# Patient Record
Sex: FEMALE | Race: WHITE | NOT HISPANIC OR LATINO | Employment: OTHER | URBAN - METROPOLITAN AREA
[De-identification: names, ages, dates, MRNs, and addresses within clinical notes are randomized per-mention and may not be internally consistent; named-entity substitution may affect disease eponyms.]

---

## 2018-06-12 ENCOUNTER — HOSPITAL ENCOUNTER (OUTPATIENT)
Dept: LAB | Facility: MEDICAL CENTER | Age: 53
End: 2018-06-12
Attending: FAMILY MEDICINE
Payer: COMMERCIAL

## 2018-06-12 LAB
ALBUMIN SERPL BCP-MCNC: 4.6 G/DL (ref 3.2–4.9)
ALBUMIN/GLOB SERPL: 1.6 G/DL
ALP SERPL-CCNC: 68 U/L (ref 30–99)
ALT SERPL-CCNC: 18 U/L (ref 2–50)
ANION GAP SERPL CALC-SCNC: 9 MMOL/L (ref 0–11.9)
AST SERPL-CCNC: 19 U/L (ref 12–45)
BASOPHILS # BLD AUTO: 0.5 % (ref 0–1.8)
BASOPHILS # BLD: 0.02 K/UL (ref 0–0.12)
BILIRUB SERPL-MCNC: 0.8 MG/DL (ref 0.1–1.5)
BUN SERPL-MCNC: 11 MG/DL (ref 8–22)
CALCIUM SERPL-MCNC: 9.4 MG/DL (ref 8.5–10.5)
CHLORIDE SERPL-SCNC: 105 MMOL/L (ref 96–112)
CHOLEST SERPL-MCNC: 200 MG/DL (ref 100–199)
CO2 SERPL-SCNC: 26 MMOL/L (ref 20–33)
CREAT SERPL-MCNC: 1.01 MG/DL (ref 0.5–1.4)
CRP SERPL HS-MCNC: 0.9 MG/L (ref 0–7.5)
DHEA-S SERPL-MCNC: 339.5 UG/DL (ref 35.4–256)
EOSINOPHIL # BLD AUTO: 0.04 K/UL (ref 0–0.51)
EOSINOPHIL NFR BLD: 1 % (ref 0–6.9)
ERYTHROCYTE [DISTWIDTH] IN BLOOD BY AUTOMATED COUNT: 45.3 FL (ref 35.9–50)
ESTRADIOL SERPL-MCNC: 44 PG/ML
FERRITIN SERPL-MCNC: 66.7 NG/ML (ref 10–291)
FOLATE SERPL-MCNC: 17.3 NG/ML
FSH SERPL-ACNC: 87.6 MIU/ML
GLOBULIN SER CALC-MCNC: 2.8 G/DL (ref 1.9–3.5)
GLUCOSE SERPL-MCNC: 97 MG/DL (ref 65–99)
HCT VFR BLD AUTO: 48.1 % (ref 37–47)
HCYS SERPL-SCNC: 12.46 UMOL/L
HDLC SERPL-MCNC: 69 MG/DL
HGB BLD-MCNC: 15.9 G/DL (ref 12–16)
IMM GRANULOCYTES # BLD AUTO: 0 K/UL (ref 0–0.11)
IMM GRANULOCYTES NFR BLD AUTO: 0 % (ref 0–0.9)
LDLC SERPL CALC-MCNC: 111 MG/DL
LH SERPL-ACNC: 43 IU/L
LYMPHOCYTES # BLD AUTO: 1.17 K/UL (ref 1–4.8)
LYMPHOCYTES NFR BLD: 27.8 % (ref 22–41)
MCH RBC QN AUTO: 31.1 PG (ref 27–33)
MCHC RBC AUTO-ENTMCNC: 33.1 G/DL (ref 33.6–35)
MCV RBC AUTO: 93.9 FL (ref 81.4–97.8)
MONOCYTES # BLD AUTO: 0.27 K/UL (ref 0–0.85)
MONOCYTES NFR BLD AUTO: 6.4 % (ref 0–13.4)
NEUTROPHILS # BLD AUTO: 2.71 K/UL (ref 2–7.15)
NEUTROPHILS NFR BLD: 64.3 % (ref 44–72)
NRBC # BLD AUTO: 0 K/UL
NRBC BLD-RTO: 0 /100 WBC
PLATELET # BLD AUTO: 112 K/UL (ref 164–446)
PMV BLD AUTO: 13.1 FL (ref 9–12.9)
POTASSIUM SERPL-SCNC: 4 MMOL/L (ref 3.6–5.5)
PROGEST SERPL-MCNC: 0.95 NG/ML
PROT SERPL-MCNC: 7.4 G/DL (ref 6–8.2)
RBC # BLD AUTO: 5.12 M/UL (ref 4.2–5.4)
SODIUM SERPL-SCNC: 140 MMOL/L (ref 135–145)
T4 FREE SERPL-MCNC: 0.76 NG/DL (ref 0.53–1.43)
TRIGL SERPL-MCNC: 102 MG/DL (ref 0–149)
TSH SERPL DL<=0.005 MIU/L-ACNC: 4.74 UIU/ML (ref 0.38–5.33)
VIT B12 SERPL-MCNC: 509 PG/ML (ref 211–911)
WBC # BLD AUTO: 4.2 K/UL (ref 4.8–10.8)

## 2018-06-12 PROCEDURE — 82728 ASSAY OF FERRITIN: CPT

## 2018-06-12 PROCEDURE — 84439 ASSAY OF FREE THYROXINE: CPT

## 2018-06-12 PROCEDURE — 86141 C-REACTIVE PROTEIN HS: CPT

## 2018-06-12 PROCEDURE — 82746 ASSAY OF FOLIC ACID SERUM: CPT

## 2018-06-12 PROCEDURE — 80061 LIPID PANEL: CPT

## 2018-06-12 PROCEDURE — 83001 ASSAY OF GONADOTROPIN (FSH): CPT

## 2018-06-12 PROCEDURE — 83704 LIPOPROTEIN BLD QUAN PART: CPT

## 2018-06-12 PROCEDURE — 82607 VITAMIN B-12: CPT

## 2018-06-12 PROCEDURE — 84443 ASSAY THYROID STIM HORMONE: CPT

## 2018-06-12 PROCEDURE — 86665 EPSTEIN-BARR CAPSID VCA: CPT

## 2018-06-12 PROCEDURE — 86664 EPSTEIN-BARR NUCLEAR ANTIGEN: CPT

## 2018-06-12 PROCEDURE — 83695 ASSAY OF LIPOPROTEIN(A): CPT

## 2018-06-12 PROCEDURE — 84144 ASSAY OF PROGESTERONE: CPT

## 2018-06-12 PROCEDURE — 83090 ASSAY OF HOMOCYSTEINE: CPT

## 2018-06-12 PROCEDURE — 82627 DEHYDROEPIANDROSTERONE: CPT

## 2018-06-12 PROCEDURE — 85025 COMPLETE CBC W/AUTO DIFF WBC: CPT

## 2018-06-12 PROCEDURE — 36415 COLL VENOUS BLD VENIPUNCTURE: CPT

## 2018-06-12 PROCEDURE — 86663 EPSTEIN-BARR ANTIBODY: CPT

## 2018-06-12 PROCEDURE — 83002 ASSAY OF GONADOTROPIN (LH): CPT

## 2018-06-12 PROCEDURE — 80053 COMPREHEN METABOLIC PANEL: CPT

## 2018-06-12 PROCEDURE — 82670 ASSAY OF TOTAL ESTRADIOL: CPT

## 2018-06-13 LAB
EBV EA-D IGG SER-ACNC: 17.1 U/ML (ref 0–10.9)
EBV NA IGG SER IA-ACNC: 281 U/ML (ref 0–21.9)
EBV VCA IGG SER IA-ACNC: 101 U/ML (ref 0–21.9)
EBV VCA IGM SER IA-ACNC: <10 U/ML (ref 0–43.9)
LPA SERPL-MCNC: 75 MG/DL

## 2018-06-15 LAB
CHOLEST SERPL-MCNC: 209 MG/DL
HDL PARTICAL NO Q4363: 40.3 UMOL/L
HDL SIZE Q4361: 9.1 NM
HDLC SERPL-MCNC: 69 MG/DL (ref 40–59)
HLD.LARGE SERPL-SCNC: 7.4 UMOL/L
L VLDL PART NO Q4357: 3.4 NMOL/L
LDL SERPL QN: 21.2 NM
LDL SERPL-SCNC: 1201 NMOL/L
LDL SMALL SERPL-SCNC: 388 NMOL/L
LDLC SERPL CALC-MCNC: 120 MG/DL
PATHOLOGY STUDY: ABNORMAL
TRIGL SERPL-MCNC: 101 MG/DL (ref 30–149)
VLDL SIZE Q4362: 49 NM

## 2018-06-20 ENCOUNTER — HOSPITAL ENCOUNTER (OUTPATIENT)
Dept: RADIOLOGY | Facility: MEDICAL CENTER | Age: 53
End: 2018-06-20
Attending: FAMILY MEDICINE
Payer: COMMERCIAL

## 2018-06-20 DIAGNOSIS — Z12.31 VISIT FOR SCREENING MAMMOGRAM: ICD-10-CM

## 2018-06-20 DIAGNOSIS — R42 DIZZINESS AND GIDDINESS: ICD-10-CM

## 2018-06-20 PROCEDURE — 93880 EXTRACRANIAL BILAT STUDY: CPT

## 2018-06-20 PROCEDURE — 77067 SCR MAMMO BI INCL CAD: CPT

## 2018-07-30 ENCOUNTER — HOSPITAL ENCOUNTER (OUTPATIENT)
Dept: LAB | Facility: MEDICAL CENTER | Age: 53
End: 2018-07-30
Attending: SPECIALIST
Payer: COMMERCIAL

## 2018-07-30 PROCEDURE — 88175 CYTOPATH C/V AUTO FLUID REDO: CPT

## 2018-07-30 PROCEDURE — 87624 HPV HI-RISK TYP POOLED RSLT: CPT

## 2018-07-31 LAB — AMBIGUOUS DTTM AMBI4: NORMAL

## 2018-08-01 LAB
CYTOLOGY REG CYTOL: NORMAL
HPV HR 12 DNA CVX QL NAA+PROBE: NEGATIVE
HPV16 DNA SPEC QL NAA+PROBE: NEGATIVE
HPV18 DNA SPEC QL NAA+PROBE: NEGATIVE
SPECIMEN SOURCE: NORMAL

## 2018-08-06 ENCOUNTER — OFFICE VISIT (OUTPATIENT)
Dept: VASCULAR LAB | Facility: MEDICAL CENTER | Age: 53
End: 2018-08-06
Attending: INTERNAL MEDICINE
Payer: COMMERCIAL

## 2018-08-06 VITALS
BODY MASS INDEX: 24.97 KG/M2 | DIASTOLIC BLOOD PRESSURE: 62 MMHG | HEIGHT: 67 IN | WEIGHT: 159.1 LBS | HEART RATE: 76 BPM | SYSTOLIC BLOOD PRESSURE: 109 MMHG

## 2018-08-06 DIAGNOSIS — E78.5 DYSLIPIDEMIA: ICD-10-CM

## 2018-08-06 DIAGNOSIS — Z80.0 FAMILY HISTORY MALIGNANT NEOPLASM OF BILIARY TRACT: ICD-10-CM

## 2018-08-06 PROCEDURE — 99203 OFFICE O/P NEW LOW 30 MIN: CPT | Performed by: INTERNAL MEDICINE

## 2018-08-06 PROCEDURE — 99212 OFFICE O/P EST SF 10 MIN: CPT | Performed by: INTERNAL MEDICINE

## 2018-08-06 PROCEDURE — 99202 OFFICE O/P NEW SF 15 MIN: CPT | Performed by: INTERNAL MEDICINE

## 2018-08-06 RX ORDER — ASPIRIN 81 MG/1
81 TABLET, CHEWABLE ORAL DAILY
Qty: 100 TAB | Refills: 11
Start: 2018-08-06 | End: 2018-10-15

## 2018-08-06 ASSESSMENT — ENCOUNTER SYMPTOMS
DEPRESSION: 0
WEIGHT LOSS: 0
GASTROINTESTINAL NEGATIVE: 1
SPEECH CHANGE: 0
DOUBLE VISION: 0
BLURRED VISION: 0
FALLS: 0
WHEEZING: 0
SHORTNESS OF BREATH: 0
LOSS OF CONSCIOUSNESS: 0
MYALGIAS: 0
COUGH: 0
BRUISES/BLEEDS EASILY: 0
PALPITATIONS: 0
CLAUDICATION: 0
FOCAL WEAKNESS: 0
SENSORY CHANGE: 0
NERVOUS/ANXIOUS: 0
DIZZINESS: 0

## 2018-09-20 ENCOUNTER — TELEPHONE (OUTPATIENT)
Dept: CARDIOLOGY | Facility: MEDICAL CENTER | Age: 53
End: 2018-09-20

## 2018-09-20 NOTE — TELEPHONE ENCOUNTER
Spoke to patient regarding cardiac records   No records to obtained  appt on 10/15/18 with Dr. Christiansen

## 2018-10-15 ENCOUNTER — OFFICE VISIT (OUTPATIENT)
Dept: CARDIOLOGY | Facility: MEDICAL CENTER | Age: 53
End: 2018-10-15
Payer: COMMERCIAL

## 2018-10-15 VITALS
BODY MASS INDEX: 25.11 KG/M2 | OXYGEN SATURATION: 97 % | SYSTOLIC BLOOD PRESSURE: 139 MMHG | HEART RATE: 84 BPM | HEIGHT: 67 IN | DIASTOLIC BLOOD PRESSURE: 84 MMHG | WEIGHT: 160 LBS

## 2018-10-15 DIAGNOSIS — Z82.3 FAMILY HISTORY OF STROKE: ICD-10-CM

## 2018-10-15 DIAGNOSIS — N95.1 PERIMENOPAUSAL: ICD-10-CM

## 2018-10-15 DIAGNOSIS — E78.2 MIXED HYPERLIPIDEMIA: ICD-10-CM

## 2018-10-15 LAB — EKG IMPRESSION: NORMAL

## 2018-10-15 PROCEDURE — 93000 ELECTROCARDIOGRAM COMPLETE: CPT | Performed by: INTERNAL MEDICINE

## 2018-10-15 PROCEDURE — 99244 OFF/OP CNSLTJ NEW/EST MOD 40: CPT | Performed by: INTERNAL MEDICINE

## 2018-10-15 ASSESSMENT — ENCOUNTER SYMPTOMS
WEIGHT LOSS: 0
NAUSEA: 0
EYES NEGATIVE: 1
LOSS OF CONSCIOUSNESS: 0
PALPITATIONS: 0
COUGH: 0
PND: 0
DEPRESSION: 0
SHORTNESS OF BREATH: 0
DIZZINESS: 0
NERVOUS/ANXIOUS: 0
BRUISES/BLEEDS EASILY: 0
HEARTBURN: 0
MUSCULOSKELETAL NEGATIVE: 1
INSOMNIA: 0

## 2018-10-15 NOTE — LETTER
[facility]   [address] , [address]  [place] [address]  Phone: [phone]  Fax: [phone]              Jenelle Ceja  1965    Encounter Date: 10/15/2018    Sonya Christiansen M.D.          PROGRESS NOTE:  No chief complaint on file.      Subjective:   Jenelle Ceja is a 53 y.o. female who presents today as a new patient    She is in with her partner for more than 19 years, Mary Anne.  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  Busy and active.  No limitations.  Go to the gym and try to keep safe on her diet.  Hoping to lose weight    She tells me she has a strong family history of heart disease and stroke.  Her father  at the age of 84.  Her mother was 69 when she relates she had an occlusion of a carotid artery.  She has seen a lipid specialist in [facility].  He strongly recommended she go on a statin.  She has been taking alternative medicines and is interested in more holistic approach.  Again healthy and active    Past Medical History:   Diagnosis Date   • UTI (lower urinary tract infection)     chronic     History reviewed. No pertinent surgical history.  History reviewed. No pertinent family history.  Social History     Social History   • Marital status: Single     Spouse name: N/A   • Number of children: N/A   • Years of education: N/A     Occupational History   • Not on file.     Social History Main Topics   • Smoking status: Never Smoker   • Smokeless tobacco: Never Used   • Alcohol use Yes      Comment: daily   • Drug use: No   • Sexual activity: Not on file     Other Topics Concern   • Not on file     Social History Narrative   • No narrative on file     No Known Allergies  Outpatient Encounter Prescriptions as of 10/15/2018   Medication Sig Dispense Refill   • Ron Root (RON PO) Take  by mouth.     • Specialty Vitamins Products (BRAIN PO) Take  by mouth.     • Ascorbic Acid (VITAMIN C ER PO) Take  by mouth.     • MAGNESIUM OXIDE PO Take  by mouth.  "    • Nutritional Supplements (CELLULAR FORTE PO) Take  by mouth.     • Omega-3 Fatty Acids (OMEGA-3 FISH OIL PO) Take  by mouth.     • Multiple Vitamins-Minerals (MULTIVITAMIN ADULT PO) Take  by mouth.     • Progesterone 40 % Cream by Does not apply route.     • Probiotic Product (PROBIOTIC-10 PO) Take  by mouth.     • Flaxseed, Linseed, (FLAX SEED OIL PO) Take  by mouth.     • valacyclovir (VALTREX) 500 MG TABS Take 500 mg by mouth as needed.     • nitrofurantoin monohydr macro (MACROBID) 100 MG CAPS Take 100 mg by mouth as needed.     • [DISCONTINUED] aspirin (ASA) 81 MG Chew Tab chewable tablet Take 1 Tab by mouth every day. 100 Tab 11     No facility-administered encounter medications on file as of 10/15/2018.      Review of Systems   Constitutional: Negative for malaise/fatigue and weight loss.   HENT: Negative for congestion and hearing loss.    Eyes: Negative.    Respiratory: Negative for cough and shortness of breath.    Cardiovascular: Negative for chest pain, palpitations, leg swelling and PND.   Gastrointestinal: Negative for heartburn and nausea.   Musculoskeletal: Negative.    Skin: Negative for itching and rash.   Neurological: Negative for dizziness and loss of consciousness.   Endo/Heme/Allergies: Does not bruise/bleed easily.   Psychiatric/Behavioral: Negative for depression. The patient is not nervous/anxious and does not have insomnia.    All other systems reviewed and are negative.       Objective:   /84 (BP Location: Left arm, Patient Position: Sitting)   Pulse 84   Ht 1.689 m (5' 6.5\")   Wt 72.6 kg (160 lb)   SpO2 97%   BMI 25.44 kg/m²      Physical Exam   Constitutional: She is oriented to person, place, and time. She appears well-developed and well-nourished.   Very healthy-appearing, mildly anxious no acute distress   HENT:   Head: Normocephalic and atraumatic.   Eyes: Pupils are equal, round, and reactive to light. EOM are normal. No scleral icterus.   Neck: No JVD present. No " thyromegaly present.   Cardiovascular: Normal rate, regular rhythm, normal heart sounds and intact distal pulses.    No murmur heard.  Pulmonary/Chest: Breath sounds normal. No respiratory distress. She exhibits no tenderness.   Abdominal: Bowel sounds are normal. She exhibits no distension.   Musculoskeletal: She exhibits no edema or tenderness.   Lymphadenopathy:     She has no cervical adenopathy.   Neurological: She is alert and oriented to person, place, and time. She exhibits normal muscle tone. Coordination normal.   Skin: Skin is warm and dry. No rash noted.   Psychiatric: She has a normal mood and affect. Her behavior is normal.       Assessment:     1. Family history of stroke  EKG   2. Mixed hyperlipidemia     3. Perimenopausal         Medical Decision Making:  Today's Assessment / Status / Plan:     We looked at her images together.  Twelve-lead EKG done and read by me shows sinus mechanism normal EKG.  Carotid ultrasound is completely normal done last month.  We looked at her LDL.  Lipoprotein a is high    Concern for heart risk  We talked about her 10-year risk  We talked for more than 1/2-hour alone about primary prevention.  We talked about aspirin use and topical hormones/bioidentical's  Talked about weight loss and diet and well-being which are clearly the most important    Family history  We talked about what a high risk family history is, heart disease in father or first-degree male relatives less than 45.  Talked about female risk.  I do not believe by AHA guidelines that these are very strong risk factors she currently has.  No disease in her siblings     Hyperlipidemia  Weight loss and diet as above  Discussed supplements at length and risks of taking these  Discussed statins.  She is not interested and I do understand this  Discussed aspirin as above  Repeat lipids in 6 months with weight loss    RTC 6 months a year or as needed.  Emotional support given as well, she is doing very very  chau FERRIS M.D.  [address]  [facility] [address]  VIA Facsimile: [phone]

## 2018-10-15 NOTE — PROGRESS NOTES
No chief complaint on file.      Subjective:   Jenelle Ceja is a 53 y.o. female who presents today as a new patient    She is in with her partner for more than 19 years, Mary Anne.  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  Busy and active.  No limitations.  Go to the gym and try to keep safe on her diet.  Hoping to lose weight    She tells me she has a strong family history of heart disease and stroke.  Her father  at the age of 84.  Her mother was 69 when she relates she had an occlusion of a carotid artery.  She has seen a lipid specialist in [facility].  He strongly recommended she go on a statin.  She has been taking alternative medicines and is interested in more holistic approach.  Again healthy and active    Past Medical History:   Diagnosis Date   • UTI (lower urinary tract infection)     chronic     History reviewed. No pertinent surgical history.  History reviewed. No pertinent family history.  Social History     Social History   • Marital status: Single     Spouse name: N/A   • Number of children: N/A   • Years of education: N/A     Occupational History   • Not on file.     Social History Main Topics   • Smoking status: Never Smoker   • Smokeless tobacco: Never Used   • Alcohol use Yes      Comment: daily   • Drug use: No   • Sexual activity: Not on file     Other Topics Concern   • Not on file     Social History Narrative   • No narrative on file     No Known Allergies  Outpatient Encounter Prescriptions as of 10/15/2018   Medication Sig Dispense Refill   • Ron Root (RON PO) Take  by mouth.     • Specialty Vitamins Products (BRAIN PO) Take  by mouth.     • Ascorbic Acid (VITAMIN C ER PO) Take  by mouth.     • MAGNESIUM OXIDE PO Take  by mouth.     • Nutritional Supplements (CELLULAR FORTE PO) Take  by mouth.     • Omega-3 Fatty Acids (OMEGA-3 FISH OIL PO) Take  by mouth.     • Multiple Vitamins-Minerals (MULTIVITAMIN ADULT PO) Take  by mouth.     • Progesterone 40 % Cream by Does not apply route.     •  "Probiotic Product (PROBIOTIC-10 PO) Take  by mouth.     • Flaxseed, Linseed, (FLAX SEED OIL PO) Take  by mouth.     • valacyclovir (VALTREX) 500 MG TABS Take 500 mg by mouth as needed.     • nitrofurantoin monohydr macro (MACROBID) 100 MG CAPS Take 100 mg by mouth as needed.     • [DISCONTINUED] aspirin (ASA) 81 MG Chew Tab chewable tablet Take 1 Tab by mouth every day. 100 Tab 11     No facility-administered encounter medications on file as of 10/15/2018.      Review of Systems   Constitutional: Negative for malaise/fatigue and weight loss.   HENT: Negative for congestion and hearing loss.    Eyes: Negative.    Respiratory: Negative for cough and shortness of breath.    Cardiovascular: Negative for chest pain, palpitations, leg swelling and PND.   Gastrointestinal: Negative for heartburn and nausea.   Musculoskeletal: Negative.    Skin: Negative for itching and rash.   Neurological: Negative for dizziness and loss of consciousness.   Endo/Heme/Allergies: Does not bruise/bleed easily.   Psychiatric/Behavioral: Negative for depression. The patient is not nervous/anxious and does not have insomnia.    All other systems reviewed and are negative.       Objective:   /84 (BP Location: Left arm, Patient Position: Sitting)   Pulse 84   Ht 1.689 m (5' 6.5\")   Wt 72.6 kg (160 lb)   SpO2 97%   BMI 25.44 kg/m²     Physical Exam   Constitutional: She is oriented to person, place, and time. She appears well-developed and well-nourished.   Very healthy-appearing, mildly anxious no acute distress   HENT:   Head: Normocephalic and atraumatic.   Eyes: Pupils are equal, round, and reactive to light. EOM are normal. No scleral icterus.   Neck: No JVD present. No thyromegaly present.   Cardiovascular: Normal rate, regular rhythm, normal heart sounds and intact distal pulses.    No murmur heard.  Pulmonary/Chest: Breath sounds normal. No respiratory distress. She exhibits no tenderness.   Abdominal: Bowel sounds are normal. " She exhibits no distension.   Musculoskeletal: She exhibits no edema or tenderness.   Lymphadenopathy:     She has no cervical adenopathy.   Neurological: She is alert and oriented to person, place, and time. She exhibits normal muscle tone. Coordination normal.   Skin: Skin is warm and dry. No rash noted.   Psychiatric: She has a normal mood and affect. Her behavior is normal.       Assessment:     1. Family history of stroke  EKG   2. Mixed hyperlipidemia     3. Perimenopausal         Medical Decision Making:  Today's Assessment / Status / Plan:     We looked at her images together.  Twelve-lead EKG done and read by me shows sinus mechanism normal EKG.  Carotid ultrasound is completely normal done last month.  We looked at her LDL.  Lipoprotein a is high    Concern for heart risk  We talked about her 10-year risk  We talked for more than 1/2-hour alone about primary prevention.  We talked about aspirin use and topical hormones/bioidentical's  Talked about weight loss and diet and well-being which are clearly the most important    Family history  We talked about what a high risk family history is, heart disease in father or first-degree male relatives less than 45.  Talked about female risk.  I do not believe by AHA guidelines that these are very strong risk factors she currently has.  No disease in her siblings     Hyperlipidemia  Weight loss and diet as above  Discussed supplements at length and risks of taking these  Discussed statins.  She is not interested and I do understand this  Discussed aspirin as above  Repeat lipids in 6 months with weight loss    RTC 6 months a year or as needed.  Emotional support given as well, she is doing very very well

## 2019-04-08 ENCOUNTER — TELEPHONE (OUTPATIENT)
Dept: VASCULAR LAB | Facility: MEDICAL CENTER | Age: 54
End: 2019-04-08

## 2019-04-08 NOTE — TELEPHONE ENCOUNTER
Patient overdue for follow-up in lipid clinic.  It looks like she has established with cardiology  We will defer further management to cardiology unless we receive other recommendations are patient contact our office    Michael Bloch, MD  Vascular Medicine    Cc: CHANG Baptiste

## 2019-07-10 ENCOUNTER — APPOINTMENT (OUTPATIENT)
Dept: RADIOLOGY | Facility: MEDICAL CENTER | Age: 54
End: 2019-07-10
Attending: FAMILY MEDICINE

## 2020-03-17 ENCOUNTER — APPOINTMENT (OUTPATIENT)
Dept: MEDICAL GROUP | Facility: IMAGING CENTER | Age: 55
End: 2020-03-17

## 2020-08-03 ENCOUNTER — APPOINTMENT (RX ONLY)
Dept: URBAN - METROPOLITAN AREA CLINIC 20 | Facility: CLINIC | Age: 55
Setting detail: DERMATOLOGY
End: 2020-08-03

## 2020-08-03 DIAGNOSIS — Z41.9 ENCOUNTER FOR PROCEDURE FOR PURPOSES OTHER THAN REMEDYING HEALTH STATE, UNSPECIFIED: ICD-10-CM

## 2020-08-03 PROCEDURE — ? SCITON BBL

## 2020-08-03 ASSESSMENT — LOCATION ZONE DERM
LOCATION ZONE: FACE
LOCATION ZONE: LIP

## 2020-08-03 ASSESSMENT — LOCATION SIMPLE DESCRIPTION DERM
LOCATION SIMPLE: CHIN
LOCATION SIMPLE: RIGHT LIP

## 2020-08-03 ASSESSMENT — LOCATION DETAILED DESCRIPTION DERM
LOCATION DETAILED: RIGHT CHIN
LOCATION DETAILED: RIGHT UPPER CUTANEOUS LIP

## 2020-08-03 NOTE — PROCEDURE: SCITON BBL
Passes: 1
Pulse Duration: 20
Spot Size: Finesse Adapter Size: 15 x 45 mm (No Finesse Adapter)
Spot Size: Finesse Adapter Size: 15 x 15 mm square
Pulse Duration Units: milliseconds
Pulse Duration: 15
Passes: 2
Price (Use Numbers Only, No Special Characters Or $): 200.00
Consent: Written consent obtained, risks reviewed including but not limited to crusting, scabbing, blistering, scarring, darker or lighter pigmentary change, bruising, and/or incomplete response.
Post-Care Instructions: I reviewed with the patient in detail post-care instructions. Patient should stay away from the sun and wear sun protection until treated areas are fully healed.
Cooling ?: Yes
Hide Repetition Rate?: No
Anesthesia Volume In Cc: 0
Fluence (J/Cm2): 25
Preprocedure Text: The treatment areas were thoroughly cleaned. Any exposed at risk hair that was not to be treated was covered in white paper tape. Clear ultrasound gel was applied to the treatment area. The area was treated with no immediate stacking of pulses.
Post Procedure Text: The patient tolerated the procedure well. Ice-chilled washclothes were applied to the treatment area for comfort. Post care was reviewed with the patient.
Repetition Rate (Hz): 3
Fluence (J/Cm2): 18
Detail Level: Zone

## 2020-08-13 ENCOUNTER — APPOINTMENT (RX ONLY)
Dept: URBAN - METROPOLITAN AREA CLINIC 20 | Facility: CLINIC | Age: 55
Setting detail: DERMATOLOGY
End: 2020-08-13

## 2020-08-13 DIAGNOSIS — L71.8 OTHER ROSACEA: ICD-10-CM

## 2020-08-13 DIAGNOSIS — B07.8 OTHER VIRAL WARTS: ICD-10-CM

## 2020-08-13 PROCEDURE — ? COUNSELING

## 2020-08-13 PROCEDURE — ? ADDITIONAL NOTES

## 2020-08-13 PROCEDURE — 99213 OFFICE O/P EST LOW 20 MIN: CPT

## 2020-08-13 PROCEDURE — ? PRESCRIPTION

## 2020-08-13 RX ORDER — METRONIDAZOLE 10 MG/G
GEL TOPICAL QD
Qty: 1 | Refills: 11 | Status: ERX

## 2020-08-13 ASSESSMENT — LOCATION DETAILED DESCRIPTION DERM
LOCATION DETAILED: LEFT CENTRAL MALAR CHEEK
LOCATION DETAILED: RIGHT PROXIMAL ULNAR DORSAL SMALL FINGER
LOCATION DETAILED: RIGHT CENTRAL MALAR CHEEK
LOCATION DETAILED: RIGHT MEDIAL FOREHEAD

## 2020-08-13 ASSESSMENT — LOCATION ZONE DERM
LOCATION ZONE: FACE
LOCATION ZONE: FINGER

## 2020-08-13 ASSESSMENT — LOCATION SIMPLE DESCRIPTION DERM
LOCATION SIMPLE: LEFT CHEEK
LOCATION SIMPLE: RIGHT SMALL FINGER
LOCATION SIMPLE: RIGHT FOREHEAD
LOCATION SIMPLE: RIGHT CHEEK

## 2020-08-13 NOTE — PROCEDURE: ADDITIONAL NOTES
Additional Notes: Patient with pustules on forehead and right>left cheek which started 1 mo ago. Around this time she switched from [place] to [employer]ta[employer] products.\\nMost consistent with rosacea today, considered allergic contact dermatitis given worsening with switch, but pustular morphology and lack of symptoms would be atypical.\\nRecommend switching back to [employer]minence products or CeraVe for now given flare\\nStart metronidazole gel qday\\nDiscussed oral doxycycline - pt defers for now
Detail Level: Simple
Additional Notes: Discussed LN2 - pt would rather use at home sal acid

## 2020-09-02 ENCOUNTER — APPOINTMENT (RX ONLY)
Dept: URBAN - METROPOLITAN AREA CLINIC 20 | Facility: CLINIC | Age: 55
Setting detail: DERMATOLOGY
End: 2020-09-02

## 2020-09-02 DIAGNOSIS — Z41.9 ENCOUNTER FOR PROCEDURE FOR PURPOSES OTHER THAN REMEDYING HEALTH STATE, UNSPECIFIED: ICD-10-CM

## 2020-09-02 PROCEDURE — ? SCITON BBL

## 2020-09-02 ASSESSMENT — LOCATION DETAILED DESCRIPTION DERM: LOCATION DETAILED: LEFT CHIN

## 2020-09-02 ASSESSMENT — LOCATION ZONE DERM: LOCATION ZONE: FACE

## 2020-09-02 ASSESSMENT — LOCATION SIMPLE DESCRIPTION DERM: LOCATION SIMPLE: CHIN

## 2020-09-02 NOTE — PROCEDURE: SCITON BBL
Fluence (J/Cm2): 25
Pulse Duration: 20
Passes: 1
Pulse Duration Units: milliseconds
Spot Size: Finesse Adapter Size: 15 x 15 mm square
Cooling ?: Yes
Price (Use Numbers Only, No Special Characters Or $): 0.00
Cooling (In C): 15
Consent: Written consent obtained, risks reviewed including but not limited to crusting, scabbing, blistering, scarring, darker or lighter pigmentary change, bruising, and/or incomplete response.
Repetition Rate (Hz): 3
Post-Care Instructions: I reviewed with the patient in detail post-care instructions. Patient should stay away from the sun and wear sun protection until treated areas are fully healed.
Fluence (J/Cm2): 18
Anesthesia Volume In Cc: 0
Repetition Rate (Hz): 2
Hide Repetition Rate?: No
Spot Size: Finesse Adapter Size: 15 x 45 mm (No Finesse Adapter)
Preprocedure Text: The treatment areas were thoroughly cleaned. Any exposed at risk hair that was not to be treated was covered in white paper tape. Clear ultrasound gel was applied to the treatment area. The area was treated with no immediate stacking of pulses.
Fluence (J/Cm2): 16
Post Procedure Text: The patient tolerated the procedure well. Ice-chilled washclothes were applied to the treatment area for comfort. Post care was reviewed with the patient.
Detail Level: Zone

## 2020-10-19 ENCOUNTER — APPOINTMENT (RX ONLY)
Dept: URBAN - METROPOLITAN AREA CLINIC 20 | Facility: CLINIC | Age: 55
Setting detail: DERMATOLOGY
End: 2020-10-19

## 2020-10-19 DIAGNOSIS — Z41.9 ENCOUNTER FOR PROCEDURE FOR PURPOSES OTHER THAN REMEDYING HEALTH STATE, UNSPECIFIED: ICD-10-CM

## 2020-10-19 PROCEDURE — ? FRAXEL

## 2020-10-19 PROCEDURE — ? SCITON BBL

## 2020-10-19 ASSESSMENT — LOCATION DETAILED DESCRIPTION DERM
LOCATION DETAILED: RIGHT LOWER CUTANEOUS LIP
LOCATION DETAILED: RIGHT SUPERIOR LATERAL NECK
LOCATION DETAILED: LEFT INFERIOR MEDIAL MALAR CHEEK
LOCATION DETAILED: RIGHT UPPER CUTANEOUS LIP
LOCATION DETAILED: LEFT CENTRAL MALAR CHEEK
LOCATION DETAILED: INFERIOR MID FOREHEAD

## 2020-10-19 ASSESSMENT — LOCATION ZONE DERM
LOCATION ZONE: FACE
LOCATION ZONE: LIP
LOCATION ZONE: NECK

## 2020-10-19 ASSESSMENT — LOCATION SIMPLE DESCRIPTION DERM
LOCATION SIMPLE: LEFT CHEEK
LOCATION SIMPLE: RIGHT ANTERIOR NECK
LOCATION SIMPLE: RIGHT LIP
LOCATION SIMPLE: INFERIOR FOREHEAD

## 2020-10-19 NOTE — PROCEDURE: FRAXEL
Wavelength: 1550nm
Number Of Passes: 1
Consent: Written consent obtained, risks reviewed including but not limited to pain and incomplete improvement.
Treatment Level: 6
Total Coverage: 14%
Length Of Topical Anesthesia Application (Optional): 60 minutes
Energy(Mj/Cm2): 20
Add Post-Care Below To The Note: No
Number Of Passes: 4
External Cooling Fan Speed: 5
Price (Use Numbers Only, No Special Characters Or $): 600.00
Location: neck
Post-Care Instructions: I reviewed with the patient in detail post-care instructions. Patient should avoid sun until area fully healed.
Detail Level: Detailed
Location: full face
Depth In Microns (Use Numbers Only, No Special Characters Or $): 4655
Total Coverage: 23%
Treatment Level: 3
Indication: photodamage
Topical Anesthesia Type: 23% Lidocaine 7% Tetracaine

## 2020-10-19 NOTE — PROCEDURE: SCITON BBL
Length Of Topical Anesthesia Application (Optional): 60 minutes
Repetition Rate (Hz): 1
Cooling (In C): 15
Fluence (J/Cm2): 25
Repetition Rate (Hz): 3
Detail Level: Zone
Pulse Duration: 20
Fluence (J/Cm2): 18
Spot Size: Finesse Adapter Size: 15 x 45 mm (No Finesse Adapter)
Pulse Duration Units: milliseconds
Price (Use Numbers Only, No Special Characters Or $): 200.00
Passes: 2
Spot Size: Finesse Adapter Size: 15 x 15 mm square
Consent: Written consent obtained, risks reviewed including but not limited to crusting, scabbing, blistering, scarring, darker or lighter pigmentary change, bruising, and/or incomplete response.
Anesthesia Volume In Cc: 0
Post-Care Instructions: I reviewed with the patient in detail post-care instructions. Patient should stay away from the sun and wear sun protection until treated areas are fully healed.
Cooling ?: Yes
Preprocedure Text: The treatment areas were thoroughly cleaned. Any exposed at risk hair that was not to be treated was covered in white paper tape. Clear ultrasound gel was applied to the treatment area. The area was treated with no immediate stacking of pulses.
Topical Anesthesia?: 23% lidocaine, 7% tetracaine
Hide Repetition Rate?: No
Post Procedure Text: The patient tolerated the procedure well. Ice-chilled washclothes were applied to the treatment area for comfort. Post care was reviewed with the patient.

## 2020-11-09 ENCOUNTER — HOSPITAL ENCOUNTER (OUTPATIENT)
Dept: LAB | Facility: MEDICAL CENTER | Age: 55
End: 2020-11-09
Attending: FAMILY MEDICINE
Payer: OTHER MISCELLANEOUS

## 2020-11-09 LAB
COVID ORDER STATUS COVID19: NORMAL
SARS-COV-2 RNA RESP QL NAA+PROBE: NOTDETECTED
SPECIMEN SOURCE: NORMAL

## 2020-11-09 PROCEDURE — C9803 HOPD COVID-19 SPEC COLLECT: HCPCS

## 2020-11-09 PROCEDURE — U0003 INFECTIOUS AGENT DETECTION BY NUCLEIC ACID (DNA OR RNA); SEVERE ACUTE RESPIRATORY SYNDROME CORONAVIRUS 2 (SARS-COV-2) (CORONAVIRUS DISEASE [COVID-19]), AMPLIFIED PROBE TECHNIQUE, MAKING USE OF HIGH THROUGHPUT TECHNOLOGIES AS DESCRIBED BY CMS-2020-01-R: HCPCS

## 2023-05-12 NOTE — PROGRESS NOTES
[facility] - Initial Visit  Date of Service: 08/06/18    Jenelle Ceja has been referred for evaluation and management of dyslipidemia    Referral Source: PCP    HPI  History of ASCVD: No  Age at Initial Diagnosis: 53    Current Prescription Lipid Lowering Medications - including dose:   Statin: None  Non-Statin: None  Current Lipid Lowering and Related Supplements:   Flax oil  Any Current Side Effects Potentially Related to Lipid Lowering therapy?   n/a  Current Adherence to Lipid Lowering Therapies   n/a  Previously Attempted Interventions for Lipids - including outcome  Statin: None    Outcome: N/A  Non-Statin: None   Outcome: N/A  Any Previous History of Statin Intolerance?   No  Baseline Lipids Prior to Treatment:   Shown here:  LDL-C: 120  nonHDL-C: 140  HDL-C: 69 Trigs: 101 Date: june 2018  Other Pertinent History:   Father had fatal stroke at 84 - No known high bp or high cholesterol  Also stroke in paternal aunt - first stroke in 70s  Mother had a carotid occlusion.  No high known cholesterol  On bio-identical hormones for about 2 years  Was recently found to have high lp(a) - was not rechecked    History of other CV risk factors:   No hypertension  No DM  No smoking  Normal carotid duplex    PAST MEDICAL HISTORY:  has a past medical history of UTI (lower urinary tract infection).     PAST SURGICAL HISTORY:   No cardiovascular or vascular surgeries    CURRENT MEDICATIONS:   Current Outpatient Prescriptions:   •  Multiple Vitamins-Minerals (MULTIVITAMIN ADULT PO), Take  by mouth., 8/6/2018  •  valACYclovir, 500 mg, Oral, BID, 7/30/2018 at Unknown  •  nitrofurantoin monohydr macro, 100 mg, Oral, BID, Unknown at Unknown     ALLERGIES: Patient has no known allergies.    SOCIAL HISTORY   History   Smoking Status   • Never Smoker   Smokeless Tobacco   • Not on file   No meth or cocaine in the past    Change in weight: up 10 and down 5  Exercise habits: spin 3-4days perweek + walks dogs every day  Diet:  low simple carb    Review of Systems   Constitutional: Positive for malaise/fatigue. Negative for weight loss.   HENT: Negative for hearing loss and tinnitus.    Eyes: Negative for blurred vision and double vision.   Respiratory: Negative for cough, shortness of breath and wheezing.    Cardiovascular: Negative for chest pain, palpitations, claudication and leg swelling.   Gastrointestinal: Negative.    Genitourinary: Negative.    Musculoskeletal: Negative for falls and myalgias.   Neurological: Negative for dizziness, sensory change, speech change, focal weakness and loss of consciousness.   Endo/Heme/Allergies: Does not bruise/bleed easily.   Psychiatric/Behavioral: Negative for depression. The patient is not nervous/anxious.      Physical Exam   Constitutional: She is oriented to person, place, and time. She appears well-developed and well-nourished. No distress.   HENT:   Head: Normocephalic and atraumatic.   Eyes: Pupils are equal, round, and reactive to light. Conjunctivae and EOM are normal. No scleral icterus.   Neck: Normal range of motion. Neck supple. No JVD present. No thyromegaly present.   Cardiovascular: Normal rate, regular rhythm, normal heart sounds and intact distal pulses.  Exam reveals no gallop and no friction rub.    No murmur heard.  Pulmonary/Chest: Effort normal and breath sounds normal. No respiratory distress. She has no wheezes. She has no rales. She exhibits no tenderness.   Abdominal: Soft. Bowel sounds are normal. She exhibits no distension and no mass. There is no tenderness. There is no rebound and no guarding.   Musculoskeletal: Normal range of motion. She exhibits no edema or tenderness.   Neurological: She is alert and oriented to person, place, and time. She has normal reflexes. No cranial nerve deficit. Coordination normal.   Skin: Skin is warm and dry. No rash noted. She is not diaphoretic. No erythema. No pallor.   Psychiatric: She has a normal mood and affect.   Vitals  reviewed.      DATA REVIEW:  Most Recent Lipid Panel:   Lab Results   Component Value Date    CHOLSTRLTOT 200 (H) 06/12/2018    CHOLSTRLTOT 209 (H) 06/12/2018    TRIGLYCERIDE 102 06/12/2018    TRIGLYCERIDE 101 06/12/2018    HDL 69 06/12/2018    HDL 69 (H) 06/12/2018     (H) 06/12/2018       Other Pertinent Blood Work:   Lab Results   Component Value Date    SODIUM 140 06/12/2018    POTASSIUM 4.0 06/12/2018    CHLORIDE 105 06/12/2018    CO2 26 06/12/2018    ANION 9.0 06/12/2018    GLUCOSE 97 06/12/2018    BUN 11 06/12/2018    CREATININE 1.01 06/12/2018    CALCIUM 9.4 06/12/2018    ASTSGOT 19 06/12/2018    ALTSGPT 18 06/12/2018    ALKPHOSPHAT 68 06/12/2018    TBILIRUBIN 0.8 06/12/2018    ALBUMIN 4.6 06/12/2018    AGRATIO 1.6 06/12/2018    LIPOPROTA 75 (H) 06/12/2018    TSHULTRASEN 4.740 06/12/2018     Recent Imaging Studies:      Carotid duplex June 2018:   No significant stenosis of the right or left carotid, subclavian, or    vertebral arteries.    ASSESSMENT AND PLAN  Patient Type, check all that apply:   Primary Prevention  Established Atherosclerotic Cardiovascular Disease (ASCVD)  No  Other Established (non-atherosclerotic) Vascular Disease, if Present:  None  Evidence of Heterozygous Familial Hypercholesterolemia (FH):   No  ACC/AHA Indication for Statin Therapy, kiran all that apply:  None   Calculated Risk for ASCVD, if applicable    1 % - but does not take into account her family history or elevated lipoprotein a  Other Significant Risk Markers, if any, kiran all that apply   Family history of premature ASCVD in first degree relative   Elevated lipoprotein a - both atherogenic and potentially prothrombotic  National Lipid Association (NLA) Goal  LDL-C:   <130 mg/dL  Lifestyle Recommendations From Today’s Visit:    Eating Plan: Concentrate on  Low sat/Trans fat   Continue excellent exercise regimen  Statin Therapy Recommendations from Today’s Visit:   - Although she does not qualify for statin therapy  based on ACC AHA guidelines and has a low calculated atherosclerotic cardiovascular disease risk score, given her elevated lipoprotein a and family history suggested that she consider statin therapy. She will think it over  - Consider coronary calcium score or ImT if patient decides not to take statin therapy  Non-Statin Medications Recommendations from Today’s Visit:   None  Indication for PCSK9 Inhibitor, if applicable:  Not currently indicated  Supplements Recommended at this visit:   - Start cholesterol  - Start aspirin  Recommendations for Other Cardiovascular Risk Factors, kiran all that apply:   - hrt - consider tapering off over the next few years    Studies Ordered at Todays Visit: None  Blood Work Ordered At Today’s visit: Complete metabolic panel, CRP, TSH, T4, NMR LipoProfile, lipoprotein a prior to next visit  Follow-Up: 3 months    Michael J Bloch, M.D.    CC:  Larry FERRIS M.D.     Hydroxyzine Pregnancy And Lactation Text: This medication is not safe during pregnancy and should not be taken. It is also excreted in breast milk and breast feeding isn't recommended.

## 2023-07-05 ENCOUNTER — APPOINTMENT (RX ONLY)
Dept: URBAN - METROPOLITAN AREA CLINIC 6 | Facility: CLINIC | Age: 58
Setting detail: DERMATOLOGY
End: 2023-07-05

## 2023-07-05 DIAGNOSIS — D22 MELANOCYTIC NEVI: ICD-10-CM

## 2023-07-05 DIAGNOSIS — L82.1 OTHER SEBORRHEIC KERATOSIS: ICD-10-CM

## 2023-07-05 DIAGNOSIS — L81.4 OTHER MELANIN HYPERPIGMENTATION: ICD-10-CM

## 2023-07-05 DIAGNOSIS — Z71.89 OTHER SPECIFIED COUNSELING: ICD-10-CM

## 2023-07-05 DIAGNOSIS — L71.8 OTHER ROSACEA: ICD-10-CM | Status: INADEQUATELY CONTROLLED

## 2023-07-05 DIAGNOSIS — D18.0 HEMANGIOMA: ICD-10-CM

## 2023-07-05 DIAGNOSIS — L44.8 OTHER SPECIFIED PAPULOSQUAMOUS DISORDERS: ICD-10-CM

## 2023-07-05 PROBLEM — D22.5 MELANOCYTIC NEVI OF TRUNK: Status: ACTIVE | Noted: 2023-07-05

## 2023-07-05 PROBLEM — D18.01 HEMANGIOMA OF SKIN AND SUBCUTANEOUS TISSUE: Status: ACTIVE | Noted: 2023-07-05

## 2023-07-05 PROCEDURE — ? PRESCRIPTION MEDICATION MANAGEMENT

## 2023-07-05 PROCEDURE — ? SUNSCREEN RECOMMENDATIONS

## 2023-07-05 PROCEDURE — ? PHOTO-DOCUMENTATION

## 2023-07-05 PROCEDURE — ? ADDITIONAL NOTES

## 2023-07-05 PROCEDURE — ? COUNSELING

## 2023-07-05 PROCEDURE — 99214 OFFICE O/P EST MOD 30 MIN: CPT

## 2023-07-05 PROCEDURE — ? PRESCRIPTION

## 2023-07-05 RX ORDER — IVERMECTIN 10 MG/G
CREAM TOPICAL
Qty: 45 | Refills: 2 | Status: ERX | COMMUNITY
Start: 2023-07-05

## 2023-07-05 RX ADMIN — IVERMECTIN: 10 CREAM TOPICAL at 00:00

## 2023-07-05 ASSESSMENT — LOCATION DETAILED DESCRIPTION DERM
LOCATION DETAILED: LEFT MEDIAL MALAR CHEEK
LOCATION DETAILED: MIDDLE STERNUM
LOCATION DETAILED: LOWER STERNUM
LOCATION DETAILED: SUBXIPHOID
LOCATION DETAILED: LEFT MEDIAL BREAST 9-10:00 REGION
LOCATION DETAILED: LEFT FOREHEAD
LOCATION DETAILED: RIGHT MEDIAL MALAR CHEEK

## 2023-07-05 ASSESSMENT — LOCATION SIMPLE DESCRIPTION DERM
LOCATION SIMPLE: RIGHT CHEEK
LOCATION SIMPLE: LEFT CHEEK
LOCATION SIMPLE: ABDOMEN
LOCATION SIMPLE: LEFT FOREHEAD
LOCATION SIMPLE: LEFT BREAST
LOCATION SIMPLE: CHEST

## 2023-07-05 ASSESSMENT — LOCATION ZONE DERM
LOCATION ZONE: FACE
LOCATION ZONE: TRUNK

## 2023-07-05 ASSESSMENT — SEVERITY ASSESSMENT OVERALL AMONG ALL PATIENTS
IN YOUR EXPERIENCE, AMONG ALL PATIENTS YOU HAVE SEEN WITH THIS CONDITION, HOW SEVERE IS THIS PATIENT'S CONDITION?: MILD TO MODERATE

## 2023-07-05 NOTE — PROCEDURE: PRESCRIPTION MEDICATION MANAGEMENT
Initiate Treatment: Ivermectin 1 % topical cream
Render In Strict Bullet Format?: No
Detail Level: Zone

## 2023-07-05 NOTE — PROCEDURE: ADDITIONAL NOTES
Render Risk Assessment In Note?: no
Additional Notes: Recommended Dr. Michelle's 10% Sulfur Bar & use of Bare Minerals make up. If lack of improvement at follow up visit will consider cosmetic treatment with Vbeam laser or Doxycycline treatment.
Detail Level: Detailed

## 2023-09-06 ENCOUNTER — APPOINTMENT (RX ONLY)
Dept: URBAN - METROPOLITAN AREA CLINIC 6 | Facility: CLINIC | Age: 58
Setting detail: DERMATOLOGY
End: 2023-09-06

## 2023-09-06 DIAGNOSIS — L82.0 INFLAMED SEBORRHEIC KERATOSIS: ICD-10-CM

## 2023-09-06 DIAGNOSIS — L71.8 OTHER ROSACEA: ICD-10-CM | Status: WELL CONTROLLED

## 2023-09-06 PROCEDURE — ? PRESCRIPTION MEDICATION MANAGEMENT

## 2023-09-06 PROCEDURE — ? COUNSELING

## 2023-09-06 PROCEDURE — 99213 OFFICE O/P EST LOW 20 MIN: CPT

## 2023-09-06 PROCEDURE — ? ADDITIONAL NOTES

## 2023-09-06 ASSESSMENT — LOCATION ZONE DERM
LOCATION ZONE: LEG
LOCATION ZONE: FACE

## 2023-09-06 ASSESSMENT — LOCATION DETAILED DESCRIPTION DERM
LOCATION DETAILED: RIGHT MEDIAL MALAR CHEEK
LOCATION DETAILED: RIGHT DISTAL PRETIBIAL REGION
LOCATION DETAILED: LEFT MEDIAL MALAR CHEEK

## 2023-09-06 ASSESSMENT — LOCATION SIMPLE DESCRIPTION DERM
LOCATION SIMPLE: LEFT CHEEK
LOCATION SIMPLE: RIGHT PRETIBIAL REGION
LOCATION SIMPLE: RIGHT CHEEK

## 2023-09-06 ASSESSMENT — SEVERITY ASSESSMENT OVERALL AMONG ALL PATIENTS
IN YOUR EXPERIENCE, AMONG ALL PATIENTS YOU HAVE SEEN WITH THIS CONDITION, HOW SEVERE IS THIS PATIENT'S CONDITION?: MINIMAL

## 2023-09-06 NOTE — PROCEDURE: ADDITIONAL NOTES
Render Risk Assessment In Note?: no
Additional Notes: Recommended Dr. Michelle's 10% Sulfur Bar.
Detail Level: Detailed

## 2023-12-18 ENCOUNTER — APPOINTMENT (RX ONLY)
Dept: URBAN - METROPOLITAN AREA CLINIC 36 | Facility: CLINIC | Age: 58
Setting detail: DERMATOLOGY
End: 2023-12-18

## 2023-12-18 DIAGNOSIS — Z41.9 ENCOUNTER FOR PROCEDURE FOR PURPOSES OTHER THAN REMEDYING HEALTH STATE, UNSPECIFIED: ICD-10-CM

## 2023-12-18 PROCEDURE — ? DIAMONDGLOW

## 2023-12-18 PROCEDURE — ? DERMAPLANE

## 2023-12-18 ASSESSMENT — LOCATION SIMPLE DESCRIPTION DERM
LOCATION SIMPLE: NOSE
LOCATION SIMPLE: RIGHT CHEEK
LOCATION SIMPLE: RIGHT FOREHEAD
LOCATION SIMPLE: LEFT CHEEK
LOCATION SIMPLE: LEFT FOREHEAD
LOCATION SIMPLE: SCALP

## 2023-12-18 ASSESSMENT — LOCATION DETAILED DESCRIPTION DERM
LOCATION DETAILED: RIGHT CENTRAL BUCCAL CHEEK
LOCATION DETAILED: RIGHT INFERIOR CENTRAL MALAR CHEEK
LOCATION DETAILED: LEFT CENTRAL FRONTAL SCALP
LOCATION DETAILED: LEFT INFERIOR CENTRAL MALAR CHEEK
LOCATION DETAILED: NASAL DORSUM
LOCATION DETAILED: RIGHT INFERIOR LATERAL FOREHEAD
LOCATION DETAILED: LEFT MEDIAL BUCCAL CHEEK
LOCATION DETAILED: LEFT INFERIOR FOREHEAD
LOCATION DETAILED: LEFT MEDIAL FOREHEAD
LOCATION DETAILED: LEFT CENTRAL MALAR CHEEK
LOCATION DETAILED: RIGHT LATERAL MALAR CHEEK
LOCATION DETAILED: RIGHT LATERAL FOREHEAD

## 2023-12-18 ASSESSMENT — LOCATION ZONE DERM
LOCATION ZONE: NOSE
LOCATION ZONE: SCALP
LOCATION ZONE: FACE

## 2023-12-18 NOTE — PROCEDURE: DIAMONDGLOW
Solution Used: Ultra Hydrating Serum
Post-Care Instructions: I reviewed with the patient in detail post-care instructions. Patient should stay away from the sun and wear sun protection until treated areas are fully healed.
Detail Level: Zone
Number Of Passes: 4
Intelliflow Setting: 100%
Body Treatment Head Used?: Not Used
Consent: Written consent obtained, risks reviewed including but not limited to crusting, scabbing, blistering, scarring, darker or lighter pigmentary change, bruising, and/or incomplete response.
Price (Use Numbers Only, No Special Characters Or $): 640
Vacuum Pressure In Inches: 10
Additional Solution Used: Even and Correct Advanced Brightening

## 2023-12-18 NOTE — PROCEDURE: DERMAPLANE
Price (Use Numbers Only, No Special Characters Or $): 74.00
Detail Level: Zone
Pre-Procedure Text: The patient was placed in a recumbant position on the procedure table.
Treatment Areas: face and neck
Post-Care Instructions: I reviewed with the patient in detail post-care instructions.
Blade: Chase scalpel
Post-Procedure Instructions: Following the dermaplane procedure, Oxymist treatment was applied to the treatment areas. Moisturizer and SPF was applied.

## 2023-12-19 ENCOUNTER — APPOINTMENT (RX ONLY)
Dept: URBAN - METROPOLITAN AREA CLINIC 36 | Facility: CLINIC | Age: 58
Setting detail: DERMATOLOGY
End: 2023-12-19

## 2023-12-19 DIAGNOSIS — Z41.9 ENCOUNTER FOR PROCEDURE FOR PURPOSES OTHER THAN REMEDYING HEALTH STATE, UNSPECIFIED: ICD-10-CM

## 2023-12-19 PROCEDURE — ? SCITON BBL

## 2023-12-19 ASSESSMENT — LOCATION ZONE DERM
LOCATION ZONE: LIP
LOCATION ZONE: TRUNK
LOCATION ZONE: NOSE
LOCATION ZONE: FACE
LOCATION ZONE: NECK

## 2023-12-19 ASSESSMENT — LOCATION SIMPLE DESCRIPTION DERM
LOCATION SIMPLE: RIGHT FOREHEAD
LOCATION SIMPLE: RIGHT LIP
LOCATION SIMPLE: CHEST
LOCATION SIMPLE: RIGHT CHEEK
LOCATION SIMPLE: NOSE
LOCATION SIMPLE: LEFT CHEEK
LOCATION SIMPLE: LEFT ANTERIOR NECK

## 2023-12-19 ASSESSMENT — LOCATION DETAILED DESCRIPTION DERM
LOCATION DETAILED: RIGHT LOWER CUTANEOUS LIP
LOCATION DETAILED: LEFT SUPERIOR ANTERIOR NECK
LOCATION DETAILED: NASAL ROOT
LOCATION DETAILED: RIGHT CENTRAL MALAR CHEEK
LOCATION DETAILED: LEFT CENTRAL MALAR CHEEK
LOCATION DETAILED: STERNAL NOTCH
LOCATION DETAILED: RIGHT INFERIOR MEDIAL FOREHEAD

## 2023-12-19 NOTE — PROCEDURE: SCITON BBL
Passes: 1
Cooling (In C): 20
Spot Size: Finesse Adapter Size: 15 x 15 mm square
Repetition Rate (Hz): 10
Pulse Duration: 15
Pulse Duration Units: milliseconds
Price (Use Numbers Only, No Special Characters Or $): 057
Cooling (In C): 25
Anesthesia Volume In Cc: 0
Cooling ?: Yes
Consent: Written consent obtained, risks reviewed including but not limited to crusting, scabbing, blistering, scarring, darker or lighter pigmentary change, bruising, and/or incomplete response.
Preprocedure Text: The treatment areas were thoroughly cleaned. Any exposed at risk hair that was not to be treated was covered in white paper tape. Clear ultrasound gel was applied to the treatment area. The area was treated with no immediate stacking of pulses.
Post-Care Instructions: I reviewed with the patient in detail post-care instructions. Patient should stay away from the sun and wear sun protection until treated areas are fully healed.
Hide Repetition Rate?: No
Post Procedure Text: The patient tolerated the procedure well. Ice-chilled washclothes were applied to the treatment area for comfort. Post care was reviewed with the patient.
Fluence (J/Cm2): 12
Detail Level: Zone
Indication Override (Will Not Show Above If Text Entered): vascular
Cooling (In C): 22
Fluence (J/Cm2): 8
Fluence (J/Cm2): 14